# Patient Record
Sex: MALE | Race: WHITE | HISPANIC OR LATINO | URBAN - METROPOLITAN AREA
[De-identification: names, ages, dates, MRNs, and addresses within clinical notes are randomized per-mention and may not be internally consistent; named-entity substitution may affect disease eponyms.]

---

## 2018-11-06 ENCOUNTER — APPOINTMENT (OUTPATIENT)
Dept: RADIOLOGY | Facility: MEDICAL CENTER | Age: 43
End: 2018-11-06
Attending: EMERGENCY MEDICINE
Payer: COMMERCIAL

## 2018-11-06 ENCOUNTER — OFFICE VISIT (OUTPATIENT)
Dept: URGENT CARE | Facility: PHYSICIAN GROUP | Age: 43
End: 2018-11-06
Payer: COMMERCIAL

## 2018-11-06 ENCOUNTER — HOSPITAL ENCOUNTER (OUTPATIENT)
Facility: MEDICAL CENTER | Age: 43
End: 2018-11-07
Attending: EMERGENCY MEDICINE | Admitting: HOSPITALIST
Payer: COMMERCIAL

## 2018-11-06 VITALS
SYSTOLIC BLOOD PRESSURE: 140 MMHG | HEART RATE: 95 BPM | DIASTOLIC BLOOD PRESSURE: 80 MMHG | OXYGEN SATURATION: 96 % | TEMPERATURE: 98.9 F | RESPIRATION RATE: 18 BRPM

## 2018-11-06 DIAGNOSIS — R06.02 SOB (SHORTNESS OF BREATH): ICD-10-CM

## 2018-11-06 DIAGNOSIS — R07.9 CHEST PAIN, UNSPECIFIED TYPE: ICD-10-CM

## 2018-11-06 LAB
ALBUMIN SERPL BCP-MCNC: 4.1 G/DL (ref 3.2–4.9)
ALBUMIN/GLOB SERPL: 1.4 G/DL
ALP SERPL-CCNC: 99 U/L (ref 30–99)
ALT SERPL-CCNC: 26 U/L (ref 2–50)
ANION GAP SERPL CALC-SCNC: 8 MMOL/L (ref 0–11.9)
ANISOCYTOSIS BLD QL SMEAR: ABNORMAL
APTT PPP: 37.5 SEC (ref 24.7–36)
AST SERPL-CCNC: 20 U/L (ref 12–45)
BASOPHILS # BLD AUTO: 0 % (ref 0–1.8)
BASOPHILS # BLD: 0 K/UL (ref 0–0.12)
BILIRUB SERPL-MCNC: 0.5 MG/DL (ref 0.1–1.5)
BNP SERPL-MCNC: 7 PG/ML (ref 0–100)
BUN SERPL-MCNC: 8 MG/DL (ref 8–22)
CALCIUM SERPL-MCNC: 9.3 MG/DL (ref 8.5–10.5)
CHLORIDE SERPL-SCNC: 103 MMOL/L (ref 96–112)
CO2 SERPL-SCNC: 25 MMOL/L (ref 20–33)
CREAT SERPL-MCNC: 0.99 MG/DL (ref 0.5–1.4)
EKG IMPRESSION: NORMAL
EOSINOPHIL # BLD AUTO: 0.3 K/UL (ref 0–0.51)
EOSINOPHIL NFR BLD: 3.5 % (ref 0–6.9)
ERYTHROCYTE [DISTWIDTH] IN BLOOD BY AUTOMATED COUNT: 35.7 FL (ref 35.9–50)
GIANT PLATELETS BLD QL SMEAR: NORMAL
GLOBULIN SER CALC-MCNC: 3 G/DL (ref 1.9–3.5)
GLUCOSE SERPL-MCNC: 106 MG/DL (ref 65–99)
HCT VFR BLD AUTO: 38.8 % (ref 42–52)
HGB BLD-MCNC: 13.1 G/DL (ref 14–18)
INR PPP: 1.16 (ref 0.87–1.13)
LIPASE SERPL-CCNC: 97 U/L (ref 11–82)
LYMPHOCYTES # BLD AUTO: 1.78 K/UL (ref 1–4.8)
LYMPHOCYTES NFR BLD: 20.9 % (ref 22–41)
MANUAL DIFF BLD: NORMAL
MCH RBC QN AUTO: 26.4 PG (ref 27–33)
MCHC RBC AUTO-ENTMCNC: 33.8 G/DL (ref 33.7–35.3)
MCV RBC AUTO: 78.2 FL (ref 81.4–97.8)
MICROCYTES BLD QL SMEAR: ABNORMAL
MONOCYTES # BLD AUTO: 0.96 K/UL (ref 0–0.85)
MONOCYTES NFR BLD AUTO: 11.3 % (ref 0–13.4)
MORPHOLOGY BLD-IMP: NORMAL
NEUTROPHILS # BLD AUTO: 5.32 K/UL (ref 1.82–7.42)
NEUTROPHILS NFR BLD: 53.9 % (ref 44–72)
NEUTS BAND NFR BLD MANUAL: 8.7 % (ref 0–10)
NRBC # BLD AUTO: 0 K/UL
NRBC BLD-RTO: 0 /100 WBC
PLATELET # BLD AUTO: 236 K/UL (ref 164–446)
PLATELET BLD QL SMEAR: NORMAL
PMV BLD AUTO: 9.3 FL (ref 9–12.9)
POTASSIUM SERPL-SCNC: 3.5 MMOL/L (ref 3.6–5.5)
PROT SERPL-MCNC: 7.1 G/DL (ref 6–8.2)
PROTHROMBIN TIME: 14.9 SEC (ref 12–14.6)
RBC # BLD AUTO: 4.96 M/UL (ref 4.7–6.1)
RBC BLD AUTO: PRESENT
SODIUM SERPL-SCNC: 136 MMOL/L (ref 135–145)
TROPONIN I SERPL-MCNC: <0.01 NG/ML (ref 0–0.04)
VARIANT LYMPHS BLD QL SMEAR: NORMAL
WBC # BLD AUTO: 8.5 K/UL (ref 4.8–10.8)

## 2018-11-06 PROCEDURE — 85007 BL SMEAR W/DIFF WBC COUNT: CPT

## 2018-11-06 PROCEDURE — 80053 COMPREHEN METABOLIC PANEL: CPT

## 2018-11-06 PROCEDURE — 83690 ASSAY OF LIPASE: CPT

## 2018-11-06 PROCEDURE — 85730 THROMBOPLASTIN TIME PARTIAL: CPT

## 2018-11-06 PROCEDURE — 93005 ELECTROCARDIOGRAM TRACING: CPT | Performed by: EMERGENCY MEDICINE

## 2018-11-06 PROCEDURE — 99203 OFFICE O/P NEW LOW 30 MIN: CPT | Performed by: FAMILY MEDICINE

## 2018-11-06 PROCEDURE — 85027 COMPLETE CBC AUTOMATED: CPT

## 2018-11-06 PROCEDURE — 84484 ASSAY OF TROPONIN QUANT: CPT

## 2018-11-06 PROCEDURE — 71045 X-RAY EXAM CHEST 1 VIEW: CPT

## 2018-11-06 PROCEDURE — 84443 ASSAY THYROID STIM HORMONE: CPT

## 2018-11-06 PROCEDURE — 99285 EMERGENCY DEPT VISIT HI MDM: CPT

## 2018-11-06 PROCEDURE — 83880 ASSAY OF NATRIURETIC PEPTIDE: CPT

## 2018-11-06 PROCEDURE — 85610 PROTHROMBIN TIME: CPT

## 2018-11-06 PROCEDURE — 93005 ELECTROCARDIOGRAM TRACING: CPT

## 2018-11-06 RX ORDER — FEBUXOSTAT 80 MG/1
TABLET ORAL
Refills: 7 | COMMUNITY
Start: 2018-08-11

## 2018-11-06 RX ORDER — CIPROFLOXACIN 500 MG/1
TABLET, FILM COATED ORAL
Refills: 0 | Status: ON HOLD | COMMUNITY
Start: 2018-11-03 | End: 2018-11-07

## 2018-11-06 ASSESSMENT — ENCOUNTER SYMPTOMS
SORE THROAT: 1
LEG PAIN: 0
VOMITING: 0
FEVER: 0
NAUSEA: 1
SHORTNESS OF BREATH: 1
HEADACHES: 1
CHANGE IN BOWEL HABIT: 1
ANOREXIA: 0
ABDOMINAL PAIN: 0
CHILLS: 1

## 2018-11-06 ASSESSMENT — COPD QUESTIONNAIRES: COPD: 0

## 2018-11-07 ENCOUNTER — APPOINTMENT (OUTPATIENT)
Dept: CARDIOLOGY | Facility: MEDICAL CENTER | Age: 43
End: 2018-11-07
Attending: HOSPITALIST
Payer: COMMERCIAL

## 2018-11-07 VITALS
WEIGHT: 208.56 LBS | DIASTOLIC BLOOD PRESSURE: 78 MMHG | RESPIRATION RATE: 16 BRPM | SYSTOLIC BLOOD PRESSURE: 140 MMHG | HEART RATE: 104 BPM | BODY MASS INDEX: 33.52 KG/M2 | HEIGHT: 66 IN | OXYGEN SATURATION: 93 % | TEMPERATURE: 98.4 F

## 2018-11-07 PROBLEM — E66.9 OBESITY: Status: ACTIVE | Noted: 2018-11-07

## 2018-11-07 PROBLEM — E87.6 HYPOKALEMIA: Status: RESOLVED | Noted: 2018-11-07 | Resolved: 2018-11-07

## 2018-11-07 PROBLEM — E87.6 HYPOKALEMIA: Status: ACTIVE | Noted: 2018-11-07

## 2018-11-07 PROBLEM — R07.9 PAIN IN THE CHEST: Status: ACTIVE | Noted: 2018-11-07

## 2018-11-07 LAB
CHOLEST SERPL-MCNC: 87 MG/DL (ref 100–199)
EKG IMPRESSION: NORMAL
HDLC SERPL-MCNC: 23 MG/DL
LDLC SERPL CALC-MCNC: 43 MG/DL
LV EJECT FRACT  99904: 60
LV EJECT FRACT MOD 2C 99903: 59.05
LV EJECT FRACT MOD 4C 99902: 57.06
LV EJECT FRACT MOD BP 99901: 56.69
TRIGL SERPL-MCNC: 105 MG/DL (ref 0–149)
TROPONIN I SERPL-MCNC: 0.02 NG/ML (ref 0–0.04)
TSH SERPL DL<=0.005 MIU/L-ACNC: 2.34 UIU/ML (ref 0.38–5.33)

## 2018-11-07 PROCEDURE — 36415 COLL VENOUS BLD VENIPUNCTURE: CPT

## 2018-11-07 PROCEDURE — 96374 THER/PROPH/DIAG INJ IV PUSH: CPT

## 2018-11-07 PROCEDURE — G0378 HOSPITAL OBSERVATION PER HR: HCPCS

## 2018-11-07 PROCEDURE — 93306 TTE W/DOPPLER COMPLETE: CPT

## 2018-11-07 PROCEDURE — 700102 HCHG RX REV CODE 250 W/ 637 OVERRIDE(OP): Performed by: INTERNAL MEDICINE

## 2018-11-07 PROCEDURE — 700102 HCHG RX REV CODE 250 W/ 637 OVERRIDE(OP): Performed by: HOSPITALIST

## 2018-11-07 PROCEDURE — A9270 NON-COVERED ITEM OR SERVICE: HCPCS | Performed by: INTERNAL MEDICINE

## 2018-11-07 PROCEDURE — 93010 ELECTROCARDIOGRAM REPORT: CPT | Performed by: INTERNAL MEDICINE

## 2018-11-07 PROCEDURE — 99236 HOSP IP/OBS SAME DATE HI 85: CPT | Performed by: INTERNAL MEDICINE

## 2018-11-07 PROCEDURE — 99220 PR INITIAL OBSERVATION CARE,LEVL III: CPT | Performed by: HOSPITALIST

## 2018-11-07 PROCEDURE — 700102 HCHG RX REV CODE 250 W/ 637 OVERRIDE(OP): Performed by: NURSE PRACTITIONER

## 2018-11-07 PROCEDURE — A9270 NON-COVERED ITEM OR SERVICE: HCPCS | Performed by: HOSPITALIST

## 2018-11-07 PROCEDURE — 700111 HCHG RX REV CODE 636 W/ 250 OVERRIDE (IP): Performed by: INTERNAL MEDICINE

## 2018-11-07 PROCEDURE — 80061 LIPID PANEL: CPT

## 2018-11-07 PROCEDURE — 90686 IIV4 VACC NO PRSV 0.5 ML IM: CPT | Performed by: HOSPITALIST

## 2018-11-07 PROCEDURE — 90471 IMMUNIZATION ADMIN: CPT

## 2018-11-07 PROCEDURE — A9270 NON-COVERED ITEM OR SERVICE: HCPCS | Performed by: NURSE PRACTITIONER

## 2018-11-07 PROCEDURE — 93308 TTE F-UP OR LMTD: CPT | Mod: 26 | Performed by: INTERNAL MEDICINE

## 2018-11-07 PROCEDURE — 700111 HCHG RX REV CODE 636 W/ 250 OVERRIDE (IP): Performed by: HOSPITALIST

## 2018-11-07 PROCEDURE — 93005 ELECTROCARDIOGRAM TRACING: CPT | Performed by: HOSPITALIST

## 2018-11-07 PROCEDURE — 84484 ASSAY OF TROPONIN QUANT: CPT

## 2018-11-07 RX ORDER — POLYETHYLENE GLYCOL 3350 17 G/17G
1 POWDER, FOR SOLUTION ORAL
Status: DISCONTINUED | OUTPATIENT
Start: 2018-11-07 | End: 2018-11-07 | Stop reason: HOSPADM

## 2018-11-07 RX ORDER — OMEPRAZOLE 20 MG/1
20 CAPSULE, DELAYED RELEASE ORAL 2 TIMES DAILY
Qty: 30 CAP | Refills: 0 | Status: SHIPPED | OUTPATIENT
Start: 2018-11-07

## 2018-11-07 RX ORDER — POTASSIUM CHLORIDE 20 MEQ/1
20 TABLET, EXTENDED RELEASE ORAL ONCE
Status: COMPLETED | OUTPATIENT
Start: 2018-11-07 | End: 2018-11-07

## 2018-11-07 RX ORDER — CALCIUM CARBONATE 500 MG/1
500 TABLET, CHEWABLE ORAL 2 TIMES DAILY PRN
COMMUNITY
Start: 2018-11-07

## 2018-11-07 RX ORDER — NITROGLYCERIN 0.4 MG/1
0.4 TABLET SUBLINGUAL
Status: DISCONTINUED | OUTPATIENT
Start: 2018-11-07 | End: 2018-11-07 | Stop reason: HOSPADM

## 2018-11-07 RX ORDER — CALCIUM CARBONATE 500 MG/1
500 TABLET, CHEWABLE ORAL DAILY
Status: DISCONTINUED | OUTPATIENT
Start: 2018-11-07 | End: 2018-11-07 | Stop reason: HOSPADM

## 2018-11-07 RX ORDER — OMEPRAZOLE 20 MG/1
20 CAPSULE, DELAYED RELEASE ORAL 2 TIMES DAILY
Status: DISCONTINUED | OUTPATIENT
Start: 2018-11-07 | End: 2018-11-07 | Stop reason: HOSPADM

## 2018-11-07 RX ORDER — ROSUVASTATIN CALCIUM 20 MG/1
20 TABLET, COATED ORAL EVERY EVENING
Status: DISCONTINUED | OUTPATIENT
Start: 2018-11-07 | End: 2018-11-07

## 2018-11-07 RX ORDER — BISACODYL 10 MG
10 SUPPOSITORY, RECTAL RECTAL
Status: DISCONTINUED | OUTPATIENT
Start: 2018-11-07 | End: 2018-11-07 | Stop reason: HOSPADM

## 2018-11-07 RX ORDER — MORPHINE SULFATE 4 MG/ML
2-4 INJECTION, SOLUTION INTRAMUSCULAR; INTRAVENOUS
Status: DISCONTINUED | OUTPATIENT
Start: 2018-11-07 | End: 2018-11-07 | Stop reason: HOSPADM

## 2018-11-07 RX ORDER — AMOXICILLIN 250 MG
2 CAPSULE ORAL 2 TIMES DAILY
Status: DISCONTINUED | OUTPATIENT
Start: 2018-11-07 | End: 2018-11-07 | Stop reason: HOSPADM

## 2018-11-07 RX ADMIN — POTASSIUM CHLORIDE 20 MEQ: 1500 TABLET, EXTENDED RELEASE ORAL at 07:24

## 2018-11-07 RX ADMIN — FAMOTIDINE 20 MG: 10 INJECTION INTRAVENOUS at 11:04

## 2018-11-07 RX ADMIN — LIDOCAINE HYDROCHLORIDE 30 ML: 20 SOLUTION OROPHARYNGEAL at 07:49

## 2018-11-07 RX ADMIN — INFLUENZA A VIRUS A/MICHIGAN/45/2015 X-275 (H1N1) ANTIGEN (FORMALDEHYDE INACTIVATED), INFLUENZA A VIRUS A/SINGAPORE/INFIMH-16-0019/2016 IVR-186 (H3N2) ANTIGEN (FORMALDEHYDE INACTIVATED), INFLUENZA B VIRUS B/PHUKET/3073/2013 ANTIGEN (FORMALDEHYDE INACTIVATED), AND INFLUENZA B VIRUS B/MARYLAND/15/2016 BX-69A ANTIGEN (FORMALDEHYDE INACTIVATED) 0.5 ML: 15; 15; 15; 15 INJECTION, SUSPENSION INTRAMUSCULAR at 11:06

## 2018-11-07 RX ADMIN — ANTACID TABLETS 500 MG: 500 TABLET, CHEWABLE ORAL at 02:49

## 2018-11-07 ASSESSMENT — LIFESTYLE VARIABLES
ON A TYPICAL DAY WHEN YOU DRINK ALCOHOL HOW MANY DRINKS DO YOU HAVE: 0
ALCOHOL_USE: YES
HOW MANY TIMES IN THE PAST YEAR HAVE YOU HAD 5 OR MORE DRINKS IN A DAY: 0
EVER FELT BAD OR GUILTY ABOUT YOUR DRINKING: NO
EVER HAD A DRINK FIRST THING IN THE MORNING TO STEADY YOUR NERVES TO GET RID OF A HANGOVER: NO
HAVE YOU EVER FELT YOU SHOULD CUT DOWN ON YOUR DRINKING: NO
CONSUMPTION TOTAL: NEGATIVE
EVER_SMOKED: YES
AVERAGE NUMBER OF DAYS PER WEEK YOU HAVE A DRINK CONTAINING ALCOHOL: 1
TOTAL SCORE: 0
HAVE PEOPLE ANNOYED YOU BY CRITICIZING YOUR DRINKING: NO

## 2018-11-07 ASSESSMENT — ENCOUNTER SYMPTOMS
VOMITING: 0
HEADACHES: 0
FOCAL WEAKNESS: 0
WHEEZING: 0
PALPITATIONS: 0
COUGH: 0
ABDOMINAL PAIN: 0
DIZZINESS: 0
DIARRHEA: 0
CHILLS: 0
DEPRESSION: 0
NAUSEA: 1
MYALGIAS: 0
TINGLING: 0
PHOTOPHOBIA: 0
SHORTNESS OF BREATH: 0
FEVER: 0
SORE THROAT: 0

## 2018-11-07 ASSESSMENT — PATIENT HEALTH QUESTIONNAIRE - PHQ9
2. FEELING DOWN, DEPRESSED, IRRITABLE, OR HOPELESS: NOT AT ALL
1. LITTLE INTEREST OR PLEASURE IN DOING THINGS: NOT AT ALL
2. FEELING DOWN, DEPRESSED, IRRITABLE, OR HOPELESS: NOT AT ALL
SUM OF ALL RESPONSES TO PHQ9 QUESTIONS 1 AND 2: 0
1. LITTLE INTEREST OR PLEASURE IN DOING THINGS: NOT AT ALL
SUM OF ALL RESPONSES TO PHQ9 QUESTIONS 1 AND 2: 0

## 2018-11-07 ASSESSMENT — PAIN SCALES - GENERAL
PAINLEVEL_OUTOF10: 5
PAINLEVEL_OUTOF10: 4

## 2018-11-07 NOTE — ED TRIAGE NOTES
Pt c/o n/v/d since last week, been unable to really keep foods down after eating chipole. Pt today has only ate crackers was walking up steps at work and began to experienced chest discomfort described as tight and has not stopped.  Pt denies pain radiating, or sob

## 2018-11-07 NOTE — PROGRESS NOTES
A/o, respirations are even and unlabored on room air ,assessment completed, pt denies any nausea and vomiting, and mid epigastric pain 5/10, vital signs stable, SR on the monitor,, updated communication board,  poc discussed and understood, verbalized understanding, all questions answered at this time , fall precautions in place, call button within reach, will continue to monitor

## 2018-11-07 NOTE — PROGRESS NOTES
Transported pt from ER to T203 via wheelchair, all belongings and charts with patient, hooked in the monitor-SR, pt tolerating well

## 2018-11-07 NOTE — H&P
Hospital Medicine History & Physical Note    Date of Service  11/7/2018    Primary Care Physician  Pcp Pt States None    Consultants  None    Code Status  Full    Chief Complaint  Chest pain    History of Presenting Illness  42 y.o. male who presented on 11/6/2018 with chest pain.  The patient is in town working at the [employer] and has been here since May.  He is visiting from [place] and will plan to be in town through December.  Patient reports a history of gout and untreated hyperlipidemia but otherwise no medical issues.  He states that several days ago, he developed nausea and vomiting with diarrhea after eating at a local restaurant.  He attributed this to food poisoning although gastroenteritis also remains in the differentials he finally began to feel well yesterday and was able to eat chicken with rice when he developed centralized chest pain.  He describes it as a pressure, tightness, which resolved spontaneously when he woke up the next day.  He had no associated diaphoresis, lightheadedness, but did have mild nausea with palpitations.  Today, around 4 PM while climbing up stairs, the patient had recurrence of his chest tightness and presented himself to the hospital for further evaluation.  He denies any history of GERD and was planning on taking over-the-counter Tums but did not do so before arriving at our facility.  He denies any alleviating or aggravating factors.    Review of Systems  Review of Systems   Constitutional: Negative for chills and fever.   HENT: Negative for congestion and sore throat.    Eyes: Negative for photophobia.   Respiratory: Negative for cough, shortness of breath and wheezing.    Cardiovascular: Positive for chest pain. Negative for palpitations.   Gastrointestinal: Positive for nausea. Negative for abdominal pain, diarrhea and vomiting.   Genitourinary: Negative for dysuria.   Musculoskeletal: Negative for myalgias.   Skin: Negative.    Neurological: Negative for  dizziness, tingling, focal weakness and headaches.   Psychiatric/Behavioral: Negative for depression and suicidal ideas.       Past Medical History  Past Medical History:   Diagnosis Date   • Gout        Surgical History  Patient denies    Family History  Maternal uncles with MIs but all in their 60s-70s, denies any family history of known MIs under the age of 50.  Father with hyperlipidemia and diabetes.    Social History  Social History   Substance Use Topics   • Smoking status: Never Smoker   • Smokeless tobacco: Never Used   • Alcohol use Not on file       Allergies  Allergies   Allergen Reactions   • Penicillins Hives       Medications  No current facility-administered medications on file prior to encounter.      No current outpatient prescriptions on file prior to encounter.       Physical Exam  Hemodynamics  Temp (24hrs), Av.6 °C (99.6 °F), Min:37.6 °C (99.6 °F), Max:37.6 °C (99.6 °F)   Temperature: 37.6 °C (99.6 °F)  Pulse  Av.7  Min: 76  Max: 101    Blood Pressure: 132/90, NIBP: 120/70      Respiratory      Respiration: 16, Pulse Oximetry: 95 %             Physical Exam   Constitutional: He is oriented to person, place, and time. No distress.   HENT:   Head: Normocephalic and atraumatic.   Right Ear: External ear normal.   Left Ear: External ear normal.   Eyes: EOM are normal. Right eye exhibits no discharge. Left eye exhibits no discharge.   Neck: Neck supple. No JVD present.   Cardiovascular: Normal rate, regular rhythm and normal heart sounds.    Pulmonary/Chest: Effort normal and breath sounds normal. No respiratory distress. He exhibits no tenderness.   Abdominal: Soft. Bowel sounds are normal. He exhibits no distension. There is no tenderness.   Musculoskeletal: He exhibits no edema.   Neurological: He is alert and oriented to person, place, and time. No cranial nerve deficit.   Skin: Skin is dry. He is not diaphoretic. No erythema.   Psychiatric: He has a normal mood and affect. His behavior  is normal.   Nursing note and vitals reviewed.    Capillary refill less than 3 seconds, distal pulses intact    Laboratory:  Recent Labs      11/06/18 2054   WBC  8.5   RBC  4.96   HEMOGLOBIN  13.1*   HEMATOCRIT  38.8*   MCV  78.2*   MCH  26.4*   MCHC  33.8   RDW  35.7*   PLATELETCT  236   MPV  9.3     Recent Labs      11/06/18 2054   SODIUM  136   POTASSIUM  3.5*   CHLORIDE  103   CO2  25   GLUCOSE  106*   BUN  8   CREATININE  0.99   CALCIUM  9.3     Recent Labs      11/06/18 2054   ALTSGPT  26   ASTSGOT  20   ALKPHOSPHAT  99   TBILIRUBIN  0.5   LIPASE  97*   GLUCOSE  106*     Recent Labs      11/06/18 2054   APTT  37.5*   INR  1.16*     Recent Labs      11/06/18 2054   BNPBTYPENAT  7         Lab Results   Component Value Date    TROPONINI <0.01 11/06/2018       Imaging  Dx-chest-limited (1 View)    Result Date: 11/6/2018 11/6/2018 10:58 PM HISTORY/REASON FOR EXAM: Chest pain TECHNIQUE/EXAM DESCRIPTION AND NUMBER OF VIEWS: Single AP view of the chest. COMPARISON: None FINDINGS: There is no evidence of focal infiltrate or pulmonary edema. The heart is normal in size. There is no pleural effusion. Bony structures and soft tissues are unremarkable.     No evidence of acute cardiopulmonary process.        Assessment/Plan:  Anticipate that patient will need less than 2 midnights for management of the discussed medical issues.    Pain in the chest   Assessment & Plan    Patient has had associated nausea although this did precede the onset of his chest pain.  At this point, unclear if this is anginal equivalent versus recent gastroenteritis.  He does have positive family history although no family members with MIs less than 50 years of age.  He has untreated hyperlipidemia.  We will monitor him overnight on telemetry for any evidence of cardiac dysrhythmias.  I will trend troponin levels and obtain serial EKGs.  I will check a TSH and a lipid panel and start him on a statin although this can be discontinued if  his lipid panel is normal.  I will also start him on a baby aspirin.  He will receive as needed morphine, oxygen, and nitroglycerin.  I will check a echocardiogram for wall motion abnormalities but otherwise given his low risk factors, I believe he can follow-up on an outpatient basis for stress test if needed if all other testing is normal including cardiac enzymes.  I will give him a trial of antacids in the event that this is acid reflux from recent gastroenteritis.         Prophylaxis: Sequential compression devices for DVT prophylaxis, no PPI indicated, bowel protocol as needed

## 2018-11-07 NOTE — PROGRESS NOTES
Subjective:     Zechariah Demarco is a 42 y.o. male who presents for Diarrhea (x3 days, HA, Nausea)       Nausea   This is a new problem. The current episode started in the past 7 days. The problem occurs daily. The problem has been gradually improving. Associated symptoms include a change in bowel habit, chills, headaches, nausea and a sore throat. Pertinent negatives include no abdominal pain, anorexia, chest pain, fever or vomiting. Treatments tried: started Cipro on saturday and he is still taking it.  The treatment provided no relief.   Shortness of Breath   This is a new problem. The current episode started today. The problem occurs constantly. The problem has been unchanged. Associated symptoms include headaches and a sore throat. Pertinent negatives include no abdominal pain, chest pain, fever, leg pain, leg swelling or vomiting. Nothing aggravates the symptoms. Risk factors: cross country travel at beginning of October. Pt from [place]. He has tried nothing for the symptoms. There is no history of allergies, asthma, COPD, DVT or PE.     Review of Systems   Constitutional: Positive for chills. Negative for fever.   HENT: Positive for sore throat.    Respiratory: Positive for shortness of breath.    Cardiovascular: Negative for chest pain and leg swelling.   Gastrointestinal: Positive for change in bowel habit and nausea. Negative for abdominal pain, anorexia and vomiting.   Neurological: Positive for headaches.     Allergies   Allergen Reactions   • Penicillins Hives      Objective:   /80   Pulse 95   Temp 37.2 °C (98.9 °F)   Resp 18   SpO2 96%   Physical Exam   Constitutional: He is oriented to person, place, and time. He appears well-developed and well-nourished.   HENT:   Head: Normocephalic and atraumatic.   Cardiovascular: Normal rate and regular rhythm.    Pulmonary/Chest: Effort normal and breath sounds normal. No respiratory distress. He has no wheezes. He has no rales. He exhibits no  tenderness.   Musculoskeletal:   No peripheral edema. No calf edema or tenderness.   Neurological: He is alert and oriented to person, place, and time.   Skin: Skin is warm and dry. Capillary refill takes less than 2 seconds.   Vitals reviewed.       Assessment/Plan:   1. SOB (shortness of breath)   Pt is not in acute distress, VSS. However, his pulse is borderline tachy, he has persistent SOB, and hx of cross country travel in the last month. Must r/o PE and nausea/ diarrhea also needs addressed.  Pt informed of limited resources for further workup and consented to go to the ER. [facility] and briefed Dr. Beckman on patient     Differential diagnosis, natural history, supportive care, and indications for immediate follow-up discussed.    I have reviewed and agree with history, assessment and plan for office encounter on 11/6/2018 with midlevel provider: Dalia.  Suggested changes to plan or follow-up: none   Twan Franklin M.D.

## 2018-11-07 NOTE — DISCHARGE SUMMARY
Discharge Summary    CHIEF COMPLAINT ON ADMISSION  Chief Complaint   Patient presents with   • N/V   • Diarrhea     Reason for Admission  Chest pain    Admission Date  11/6/2018    CODE STATUS  Full Code    HPI & HOSPITAL COURSE  This is a 42 y.o. male here with chest pain and nausea.  He has a past medical history of gout and untreated hyperlipidemia, though his lipid panel here was within normal limits.  He has a recent history of gastroenteritis after eating at a local restaurant.  Workup was completed and revealed an unremarkable CBC and BMP outside of a mildly low potassium of 3.5 which was repleted.  Lipase was slightly elevated at 97, and is likely due to his previous nausea and vomiting.  His EKG showed no evidence of ischemia or infarct.  Troponins were negative x 2, thyroid function intact, and BNP normal at 7.  An echocardiogram was performed and was normal.  A chest x-ray was also done and was negative for acute cardiopulmonary abnormalities.  He was treated with IV pepcid in addition to a GI cocktail, which have resolved his symptoms.  His vital signs have remained stable and he feels ready for discharge.    Therefore, he is discharged in good and stable condition to home with close outpatient follow-up.    Discharge Date  11/7/2018    FOLLOW UP ITEMS POST DISCHARGE  PCP in 1-2 weeks    DISCHARGE DIAGNOSES  Active Problems:    Pain in the chest POA: Yes    Hypokalemia POA: Yes    Obesity POA: Yes  Resolved Problems:    * No resolved hospital problems. *    FOLLOW UP  Future Appointments  Date Time Provider Department Center   11/9/2018 8:20 AM [facility] [address]   11/14/2018 7:40 AM Odessa Montague M.D. [address]   11/28/2018 2:15 PM Hermes Watters M.D. [facility] None     MEDICATIONS ON DISCHARGE     Medication List      START taking these medications      Instructions   calcium carbonate 500 MG Chew  Commonly known as:  TUMS   Take 1 Tab by mouth 2 times a day as  needed.  Dose:  500 mg     hyoscyamine-maalox plus-lidocaine viscous  Commonly known as:  GI COCKTAIL   Take 30 mL by mouth every four hours as needed (abd pain).  Dose:  30 mL     omeprazole 20 MG delayed-release capsule  Commonly known as:  PRILOSEC   Take 1 Cap by mouth 2 Times a Day.  Dose:  20 mg        CONTINUE taking these medications      Instructions   COLCHICINE PO   Take  by mouth.     ULORIC 80 MG Tabs  Generic drug:  febuxostat   TK 1 T PO  QD        STOP taking these medications    ciprofloxacin 500 MG Tabs  Commonly known as:  CIPRO          Allergies  Allergies   Allergen Reactions   • Penicillins Hives     DIET  Orders Placed This Encounter   Procedures   • Diet Order Regular     Standing Status:   Standing     Number of Occurrences:   1     Order Specific Question:   Diet:     Answer:   Regular [1]     ACTIVITY  As tolerated.  Exercise encouraged.    CONSULTATIONS  None    PROCEDURES  None    LABORATORY  Lab Results   Component Value Date    SODIUM 136 11/06/2018    POTASSIUM 3.5 (L) 11/06/2018    CHLORIDE 103 11/06/2018    CO2 25 11/06/2018    GLUCOSE 106 (H) 11/06/2018    BUN 8 11/06/2018    CREATININE 0.99 11/06/2018      Lab Results   Component Value Date    WBC 8.5 11/06/2018    HEMOGLOBIN 13.1 (L) 11/06/2018    HEMATOCRIT 38.8 (L) 11/06/2018    PLATELETCT 236 11/06/2018

## 2018-11-07 NOTE — PROGRESS NOTES
12-hour shift tele summary. Sr on the monitor, no ectopy noted, pt still complaining of chest pain after taking TUMS, bedside report given to RN Kenny

## 2018-11-07 NOTE — DISCHARGE PLANNING
Care Transition Team Assessment    Spoke with patient at bedside.    Information Source  Orientation : Oriented x 4  Information Given By: Patient    Readmission Evaluation  Is this a readmission?: No      Interdisciplinary Discharge Planning  Primary Care Physician: Dr Brigido Morris and Dr Jt Ramos  Patient or legal guardian wants to designate a caregiver (see row info): No  Support Systems: Spouse / Significant Other  Do You Take your Prescribed Medications Regularly: Yes  Able to Return to Previous ADL's: Yes  Mobility Issues: No  Prior Services: None  Patient Expects to be Discharged to::  (Home)  Assistance Needed: No  Durable Medical Equipment: Not Applicable      Anticipated Discharge Information  Anticipated discharge disposition: Home  Discharge Address: [address]  Discharge Contact Phone Number: [phone]

## 2018-11-07 NOTE — ED PROVIDER NOTES
"ED Provider Note    Scribed for Rayne Mcgowan D.O. by Rebekah Vela. 11/6/2018, 11:25 PM.    Primary care provider: Pcp Pt States None  Means of arrival: walk-in  History obtained from: Patient  History limited by: none    CHIEF COMPLAINT  Chief Complaint   Patient presents with   • N/V   • Diarrhea       HPI  Zechariah Demarco is a 42 y.o. male who presents to the Emergency Department with chest pain onset 7.5 hours ago. Patient describes the pain as a \"tightness/pressure\" that started when he was walking up stairs. He endorses having high cholesterol, however, denies taking any medications for high cholesterol. Patient denies shortness of breath, smoking, history of diabetes, history of blood clots, leg swelling, recent travel out of the country, or breaking out into a sweat when the pain occurs. Patient denies a family history of anyone dying before the age of 50 due to cardiac issues although he states 3 of his uncles passed away from heart attacks.  He states that they were elderly when this occurred.  He denies ever having had a stress test.  Patient reports diarrhea, vomiting, nausea since 5 days ago that has since improved.  He endorses being prescribed antibiotics 3 days ago by his primary care physician, which has imrpvoed his nausea, vomiting, and diarrhea.    REVIEW OF SYSTEMS  See HPI for further details. All other systems are negative.     PAST MEDICAL HISTORY   has a past medical history of Gout.    SURGICAL HISTORY  patient denies any surgical history    SOCIAL HISTORY  Social History   Substance Use Topics   • Smoking status: Never Smoker   • Smokeless tobacco: Never Used      History   Drug use: Unknown       FAMILY HISTORY  History reviewed. No pertinent family history.    CURRENT MEDICATIONS  Reviewed.  See Encounter Summary.     ALLERGIES  Allergies   Allergen Reactions   • Penicillins Hives       PHYSICAL EXAM  VITAL SIGNS: /90   Pulse 98   Temp 37.6 °C (99.6 °F) (Temporal)   Resp 18   " "Ht 1.676 m (5' 6\")   Wt 96.4 kg (212 lb 8.4 oz)   SpO2 95%   BMI 34.30 kg/m²   Constitutional: Alert and in no apparent distress.  HENT: Normocephalic atraumatic. Bilateral external ears normal. Nose normal. Mucous membranes are moist.  Eyes: Pupils are equal and reactive. Conjunctiva normal. Non-icteric sclera.   Neck: Normal range of motion without tenderness. Supple. No meningeal signs.  Cardiovascular: Regular rate and rhythm. No murmurs, gallops or rubs.  Thorax & Lungs: Breath sounds are clear to auscultation bilaterally. No wheezing, rhonchi or rales.  Abdomen: Soft, nontender and nondistended. No peritoneal signs noted.  Skin: Warm and dry. No rashes are noted.  Back: No bony tenderness, No CVA tenderness.   Extremities: 2+ peripheral pulses. Cap refill is less than 2 seconds. No edema, cyanosis, or clubbing.  Musculoskeletal: Good range of motion in all major joints. No tenderness to palpation or major deformities noted.   Neurologic: Alert and oriented ×3. The patient moves all 4 extremities and follows commands.  Psychiatric: Affect is normal. Judgment appears to be intact.    DIAGNOSTIC STUDIES / PROCEDURES     LABS  Results for orders placed or performed during the hospital encounter of 11/06/18   Troponin   Result Value Ref Range    Troponin I <0.01 0.00 - 0.04 ng/mL   Btype Natriuretic Peptide   Result Value Ref Range    B Natriuretic Peptide 7 0 - 100 pg/mL   CBC with Differential   Result Value Ref Range    WBC 8.5 4.8 - 10.8 K/uL    RBC 4.96 4.70 - 6.10 M/uL    Hemoglobin 13.1 (L) 14.0 - 18.0 g/dL    Hematocrit 38.8 (L) 42.0 - 52.0 %    MCV 78.2 (L) 81.4 - 97.8 fL    MCH 26.4 (L) 27.0 - 33.0 pg    MCHC 33.8 33.7 - 35.3 g/dL    RDW 35.7 (L) 35.9 - 50.0 fL    Platelet Count 236 164 - 446 K/uL    MPV 9.3 9.0 - 12.9 fL    Nucleated RBC 0.00 /100 WBC    NRBC (Absolute) 0.00 K/uL    Neutrophils-Polys 53.90 44.00 - 72.00 %    Lymphocytes 20.90 (L) 22.00 - 41.00 %    Monocytes 11.30 0.00 - 13.40 %    " Eosinophils 3.50 0.00 - 6.90 %    Basophils 0.00 0.00 - 1.80 %    Neutrophils (Absolute) 5.32 1.82 - 7.42 K/uL    Lymphs (Absolute) 1.78 1.00 - 4.80 K/uL    Monos (Absolute) 0.96 (H) 0.00 - 0.85 K/uL    Eos (Absolute) 0.30 0.00 - 0.51 K/uL    Baso (Absolute) 0.00 0.00 - 0.12 K/uL    Anisocytosis 2+     Microcytosis 2+    Complete Metabolic Panel (CMP)   Result Value Ref Range    Sodium 136 135 - 145 mmol/L    Potassium 3.5 (L) 3.6 - 5.5 mmol/L    Chloride 103 96 - 112 mmol/L    Co2 25 20 - 33 mmol/L    Anion Gap 8.0 0.0 - 11.9    Glucose 106 (H) 65 - 99 mg/dL    Bun 8 8 - 22 mg/dL    Creatinine 0.99 0.50 - 1.40 mg/dL    Calcium 9.3 8.5 - 10.5 mg/dL    AST(SGOT) 20 12 - 45 U/L    ALT(SGPT) 26 2 - 50 U/L    Alkaline Phosphatase 99 30 - 99 U/L    Total Bilirubin 0.5 0.1 - 1.5 mg/dL    Albumin 4.1 3.2 - 4.9 g/dL    Total Protein 7.1 6.0 - 8.2 g/dL    Globulin 3.0 1.9 - 3.5 g/dL    A-G Ratio 1.4 g/dL   Prothrombin Time   Result Value Ref Range    PT 14.9 (H) 12.0 - 14.6 sec    INR 1.16 (H) 0.87 - 1.13   APTT   Result Value Ref Range    APTT 37.5 (H) 24.7 - 36.0 sec   Lipase   Result Value Ref Range    Lipase 97 (H) 11 - 82 U/L   ESTIMATED GFR   Result Value Ref Range    GFR If African American >60 >60 mL/min/1.73 m 2    GFR If Non African American >60 >60 mL/min/1.73 m 2   DIFFERENTIAL MANUAL   Result Value Ref Range    Bands-Stabs 8.70 0.00 - 10.00 %    Manual Diff Status PERFORMED    PERIPHERAL SMEAR REVIEW   Result Value Ref Range    Peripheral Smear Review see below    PLATELET ESTIMATE   Result Value Ref Range    Plt Estimation Normal    MORPHOLOGY   Result Value Ref Range    RBC Morphology Present     Giant Platelets 1+     Reactive Lymphocytes Few    EKG   Result Value Ref Range    Report       [facility] Emergency Dept.    Test Date:  2018-11-06  Pt Name:    LÓPEZ GAGNON                Department: ER  MRN:        [id number]                      Room:  Gender:     Male                          Technician: [id number]  :        [id number]                   Requested By:ER TRIAGE PROTOCOL  Order #:    [id number]                    Reading MD:    Measurements  Intervals                                Axis  Rate:       90                           P:          33  SD:         148                          QRS:        11  QRSD:       90                           T:          -8  QT:         356  QTc:        436    Interpretive Statements  SINUS RHYTHM  BORDERLINE T ABNORMALITIES, INFERIOR LEADS  No previous ECG available for comparison         All labs were reviewed by me.    EKG  EKG was performed at 20:41 shows normal sinus rhythm with heart rate of 90. SD interval is 148. QT/QTc are 356/436. Axis appears normal. No acute ST elevations or depressions are noted. Borderline T wave abnormalities in inferior leads. No previous EKG available.Impression: Otherwise normal EKG      RADIOLOGY  DX-CHEST-LIMITED (1 VIEW)   Final Result      No evidence of acute cardiopulmonary process.        The radiologist's interpretation of all radiological studies have been reviewed by me.    COURSE & MEDICAL DECISION MAKING  Pertinent Labs & Imaging studies reviewed. (See chart for details)    11:25 PM - Patient seen and examined at bedside. Ordered chest x-ray, estimated gfr, differential manual, peripheral smear review, platelet estimate, morphology, troponin, BNP, CBC, CMP, prothrombin, APTT, lipase, EKG to evaluate his symptoms.     12:18 AM - Paged hospitalist.     12:36 AM -  I discussed the patient's case and the above findings with Dr. Ontiveros (hospitalist) who agreed to admit the patient    DISPOSITION:  Patient will be admitted to Dr. Ontiveros in stable condition.    Decision Making:  This is a 42 y.o. year old male who presents with chest pressure.  On initial evaluation, the patient appeared quite well and in no acute distress.  His vital signs were stable.  His physical exam was unremarkable.  EKG had been performed in triage  and did not reveal any evidence of acute ischemia.  First troponin was negative.  BNP was normal.  The remainder of his blood work was unremarkable.  Chest x-ray did not show any evidence of pneumothorax, pulmonary edema, pleural effusion, or cardiomegaly.  The patient's HEART score is 2 which puts him at a low risk of major adverse cardiac events; however, I am concerned as he is here from [place] for work and is unable to follow-up with his primary care physician in a timely manner.  The plan was made to admit the patient for stress testing and observation overnight.  He remained stable on the emergency department.    FINAL IMPRESSION  1. Chest pain, unspecified type          I, Rebekah Vela (Remi), am scribing for, and in the presence of, Rayne Mcgowan D.O..    Electronically signed by: Rebekah Vela (Remi), 11/6/2018    IRayne D.O. personally performed the services described in this documentation, as scribed by Rebekah Vela in my presence, and it is both accurate and complete. C.    The note accurately reflects work and decisions made by me.  Rayne Mcgowan  11/7/2018  12:49 AM

## 2018-11-09 ENCOUNTER — PATIENT OUTREACH (OUTPATIENT)
Dept: HEALTH INFORMATION MANAGEMENT | Facility: OTHER | Age: 43
End: 2018-11-09

## 2018-11-09 NOTE — PROGRESS NOTES
11/9/18 8:20am:  CM post discharge outreach call first attempt.   left requesting a return call to  at [phone].

## 2020-12-10 NOTE — DISCHARGE INSTRUCTIONS
Discharge Instructions    Discharged to home by car with self. Discharged via walking, hospital escort: Yes.  Special equipment needed: Not Applicable    Be sure to schedule a follow-up appointment with your primary care doctor or any specialists as instructed.     Discharge Plan:   Diet Plan: Discussed  Activity Level: Discussed  Confirmed Follow up Appointment: Appointment Scheduled  Confirmed Symptoms Management: Discussed  Medication Reconciliation Updated: Yes  Influenza Vaccine Indication: Indicated: 9 to 64 years of age  Influenza Vaccine Given - only chart on this line when given: Influenza Vaccine Given (See MAR)    I understand that a diet low in cholesterol, fat, and sodium is recommended for good health. Unless I have been given specific instructions below for another diet, I accept this instruction as my diet prescription.   Other diet: low fat    Special Instructions: None    · Is patient discharged on Warfarin / Coumadin?   No     Depression / Suicide Risk    As you are discharged from this [facility] facility, it is important to learn how to keep safe from harming yourself.    Recognize the warning signs:  · Abrupt changes in personality, positive or negative- including increase in energy   · Giving away possessions  · Change in eating patterns- significant weight changes-  positive or negative  · Change in sleeping patterns- unable to sleep or sleeping all the time   · Unwillingness or inability to communicate  · Depression  · Unusual sadness, discouragement and loneliness  · Talk of wanting to die  · Neglect of personal appearance   · Rebelliousness- reckless behavior  · Withdrawal from people/activities they love  · Confusion- inability to concentrate     If you or a loved one observes any of these behaviors or has concerns about self-harm, here's what you can do:  · Talk about it- your feelings and reasons for harming yourself  · Remove any means that you might use to hurt yourself (examples:  pills, rope, extension cords, firearm)  · Get professional help from the community (Mental Health, Substance Abuse, psychological counseling)  · Do not be alone:Call your Safe Contact- someone whom you trust who will be there for you.  · Call your local CRISIS HOTLINE 152-4707 or 844-923-7371  · Call your local Children's Mobile Crisis Response Team [place] (245) 361-7710 or www.[employer]  · Call the toll free National Suicide Prevention Hotlines   · National Suicide Prevention Lifeline 075-402-OIVC (9918)  · National Hope Line Network 800-SUICIDE (188-3810)       No